# Patient Record
Sex: FEMALE | Race: WHITE | NOT HISPANIC OR LATINO | Employment: UNEMPLOYED | URBAN - METROPOLITAN AREA
[De-identification: names, ages, dates, MRNs, and addresses within clinical notes are randomized per-mention and may not be internally consistent; named-entity substitution may affect disease eponyms.]

---

## 2021-11-11 ENCOUNTER — APPOINTMENT (OUTPATIENT)
Dept: NUCLEAR MEDICINE | Age: 56
End: 2021-11-11

## 2021-11-11 ENCOUNTER — APPOINTMENT (OUTPATIENT)
Dept: NUCLEAR MEDICINE | Age: 56
End: 2021-11-11
Attending: SURGERY

## 2021-11-12 ENCOUNTER — HOSPITAL ENCOUNTER (OUTPATIENT)
Dept: CARDIOLOGY | Age: 56
Discharge: HOME | End: 2021-11-12

## 2021-11-12 ENCOUNTER — APPOINTMENT (OUTPATIENT)
Dept: CARDIOLOGY | Age: 56
End: 2021-11-12

## 2021-11-12 DIAGNOSIS — C50.919 BREAST CA (CMD): ICD-10-CM

## 2021-11-12 PROCEDURE — 93306 TTE W/DOPPLER COMPLETE: CPT

## 2021-11-23 ENCOUNTER — HOSPITAL ENCOUNTER (OUTPATIENT)
Dept: NUCLEAR MEDICINE | Age: 56
Discharge: HOME | End: 2021-11-23

## 2021-11-23 DIAGNOSIS — C50.919 BREAST CA (CMD): ICD-10-CM

## 2021-11-23 PROCEDURE — 78306 BONE IMAGING WHOLE BODY: CPT

## 2021-11-23 PROCEDURE — A9503 TC99M MEDRONATE: HCPCS

## 2021-11-23 PROCEDURE — 10006150 HB RX 343

## 2021-11-23 RX ORDER — TC 99M MEDRONATE 20 MG/10ML
24.4 INJECTION, POWDER, LYOPHILIZED, FOR SOLUTION INTRAVENOUS ONCE
Status: COMPLETED | OUTPATIENT
Start: 2021-11-23 | End: 2021-11-23

## 2021-11-23 RX ADMIN — TC 99M MEDRONATE 24.4 MILLICURIE: 20 INJECTION, POWDER, LYOPHILIZED, FOR SOLUTION INTRAVENOUS at 13:15

## 2023-06-26 ENCOUNTER — NURSE NAVIGATOR ENCOUNTER (OUTPATIENT)
Dept: HEMATOLOGY/ONCOLOGY | Facility: HOSPITAL | Age: 58
End: 2023-06-26

## 2023-06-26 NOTE — PROGRESS NOTES
Received phone call from [facility] from patient stating that she is a triple negative breast cancer patient that has had her surgery and is receiving chemotherapy at [facility] in [place]. She stated that she spoke with her medical oncologist and they both agreed since she is not \"responding well\" to her chemotherapy regimen she should seek a second opinion at [facility] with Dr. Tonie Bence. I stated that we will need the medical records and any recent images such as a PET scan and echo. She stated she had none of these and asked why I couldn't use [facility] and I stated that these records are not in her chart yet because we had to create her chart since she is not an [facility] patient. She asked if I could obtain her records and images for her and I stated I will try but she will need to obtain them if I am not able to do so and she verbalized understanding. Patient stated her medical oncologist is Dr. Michael Kline. I stated I would work on her records and image request and would contact her with update her once I receive the records with an appointment with Dr. Tonie Bence at the [facility]. Pt was provided with the breast nurse navigators contact information and was encouraged to phone with any other questions or concerns.

## 2023-06-26 NOTE — PROGRESS NOTES
Called patient and offered her an appointment with Dr. Tati Welch for a second opinion while she received chemotherapy and I stated that I received her records and images from [facility]. Offered appointment in [address] [place] [address] on July 20, 2023 at 56 for his first available consultation, yet patient politely declined stating that she lives in [place] and [place] is closer to her. Offered her an appointment with Dr. Tati Welch on Monday July 24, 2023 and provided the location to the [facility]. She thanked me for the phone call back and assistance. Pt was provided with the breast nurse navigators contact information and was encouraged to phone with any other questions or concerns.

## 2023-07-20 ENCOUNTER — APPOINTMENT (OUTPATIENT)
Dept: HEMATOLOGY/ONCOLOGY | Age: 58
End: 2023-07-20
Attending: INTERNAL MEDICINE
Payer: COMMERCIAL

## 2023-07-24 ENCOUNTER — NURSE NAVIGATOR ENCOUNTER (OUTPATIENT)
Dept: HEMATOLOGY/ONCOLOGY | Facility: HOSPITAL | Age: 58
End: 2023-07-24

## 2023-07-24 ENCOUNTER — OFFICE VISIT (OUTPATIENT)
Dept: HEMATOLOGY/ONCOLOGY | Facility: HOSPITAL | Age: 58
End: 2023-07-24
Attending: INTERNAL MEDICINE
Payer: COMMERCIAL

## 2023-07-24 VITALS
RESPIRATION RATE: 18 BRPM | OXYGEN SATURATION: 95 % | TEMPERATURE: 98 F | DIASTOLIC BLOOD PRESSURE: 76 MMHG | SYSTOLIC BLOOD PRESSURE: 114 MMHG | HEART RATE: 70 BPM | WEIGHT: 177 LBS

## 2023-07-24 DIAGNOSIS — C79.89 METASTATIC CANCER TO CHEST WALL (HCC): Primary | ICD-10-CM

## 2023-07-24 NOTE — PROGRESS NOTES
Met with patient in the waiting of the [facility] after Ivy Mena, informed me patient arrived for her 1300 appointment with Dr. Tonie Bence stating that she would like to see him since she thought the appointment was at 1400 today. I informed her, unfortunately, her appointment was scheduled with at 1300 and Dr. Tonie Bence does not have the time today to see her. Offered her an appointment for tomorrow, Tuesday July 25, 2023 at 18, at the [facility] with Dr. Tonie Bence and she accepted and was scheduled. I stated that I sent her a [employer] activation code to check her appointment dates and times and she stated she remembered seeing the text. She asked if we called our appointments that don't show and I stated that we don't always call our patients right away. Allowed patient to vent her frustrations and verbal and emotional reassurance provided.

## 2023-07-25 ENCOUNTER — OFFICE VISIT (OUTPATIENT)
Dept: HEMATOLOGY/ONCOLOGY | Facility: HOSPITAL | Age: 58
End: 2023-07-25
Attending: INTERNAL MEDICINE
Payer: COMMERCIAL

## 2023-07-25 VITALS
TEMPERATURE: 98 F | HEART RATE: 107 BPM | SYSTOLIC BLOOD PRESSURE: 128 MMHG | WEIGHT: 176 LBS | DIASTOLIC BLOOD PRESSURE: 85 MMHG | RESPIRATION RATE: 16 BRPM | OXYGEN SATURATION: 98 %

## 2023-07-25 DIAGNOSIS — C50.911 CARCINOMA OF RIGHT BREAST METASTATIC TO SKIN (HCC): ICD-10-CM

## 2023-07-25 DIAGNOSIS — Z17.1 MALIGNANT NEOPLASM OF OVERLAPPING SITES OF RIGHT BREAST IN FEMALE, ESTROGEN RECEPTOR NEGATIVE: Primary | ICD-10-CM

## 2023-07-25 DIAGNOSIS — C50.811 MALIGNANT NEOPLASM OF OVERLAPPING SITES OF RIGHT BREAST IN FEMALE, ESTROGEN RECEPTOR NEGATIVE: Primary | ICD-10-CM

## 2023-07-25 DIAGNOSIS — C79.2 CARCINOMA OF RIGHT BREAST METASTATIC TO SKIN (HCC): ICD-10-CM

## 2023-07-25 DIAGNOSIS — C79.89 METASTATIC CANCER TO CHEST WALL (HCC): ICD-10-CM

## 2023-07-25 PROCEDURE — 99205 OFFICE O/P NEW HI 60 MIN: CPT | Performed by: INTERNAL MEDICINE

## 2023-07-25 RX ORDER — ONDANSETRON 8 MG/1
8 TABLET, ORALLY DISINTEGRATING ORAL
COMMUNITY
Start: 2023-05-11

## 2023-07-25 RX ORDER — CHOLECALCIFEROL (VITAMIN D3) 125 MCG
1 CAPSULE ORAL DAILY
COMMUNITY

## 2023-07-25 RX ORDER — ALPRAZOLAM 0.5 MG/1
1 TABLET ORAL EVERY 12 HOURS PRN
COMMUNITY
Start: 2021-12-27

## 2023-07-25 RX ORDER — HYDROCODONE BITARTRATE AND ACETAMINOPHEN 5; 325 MG/1; MG/1
TABLET ORAL EVERY 4 HOURS PRN
COMMUNITY
Start: 2023-07-05

## 2023-07-25 RX ORDER — NALOXONE HYDROCHLORIDE 4 MG/.1ML
4 SPRAY NASAL
COMMUNITY
Start: 2023-05-17

## 2023-07-25 NOTE — PROGRESS NOTES
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 This was just noted within the past month. She has had 4 cycles of enhertu so far. She feels that this is not working for her. The area of the skin affected is getting larger. It itches and hurts her. She denies any other pain. She is using Norco and has some constipation from this,  She has not been taking her probiotic which usually helps her. She says that she is eating well.       Education Record    Learner:  Patient and Friend    Disease / Diagnosis: Breast cancer    Barriers / Limitations:  None   Comments:    Method:  Discussion   Comments:    General Topics:  Other:  reason for consultation and breast cancer history   Comments:    Outcome:  Shows understanding   Comments:

## 2023-08-01 PROBLEM — C79.89 METASTATIC CANCER TO CHEST WALL (HCC): Status: ACTIVE | Noted: 2023-08-01
